# Patient Record
Sex: MALE | Race: WHITE | NOT HISPANIC OR LATINO | URBAN - METROPOLITAN AREA
[De-identification: names, ages, dates, MRNs, and addresses within clinical notes are randomized per-mention and may not be internally consistent; named-entity substitution may affect disease eponyms.]

---

## 2017-04-30 ENCOUNTER — EMERGENCY (EMERGENCY)
Facility: HOSPITAL | Age: 47
LOS: 1 days | Discharge: HOME | End: 2017-04-30
Attending: EMERGENCY MEDICINE
Payer: COMMERCIAL

## 2017-04-30 VITALS
OXYGEN SATURATION: 98 % | WEIGHT: 190.04 LBS | SYSTOLIC BLOOD PRESSURE: 142 MMHG | RESPIRATION RATE: 20 BRPM | DIASTOLIC BLOOD PRESSURE: 84 MMHG | HEIGHT: 70 IN | TEMPERATURE: 98 F | HEART RATE: 90 BPM
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
## 2017-04-30 DIAGNOSIS — S61.211A LACERATION WITHOUT FOREIGN BODY OF LEFT INDEX FINGER WITHOUT DAMAGE TO NAIL, INITIAL ENCOUNTER: ICD-10-CM
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
## 2017-04-30 DIAGNOSIS — Z23 ENCOUNTER FOR IMMUNIZATION: ICD-10-CM
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
## 2017-04-30 DIAGNOSIS — S00.531A CONTUSION OF LIP, INITIAL ENCOUNTER: ICD-10-CM

## 2017-04-30 PROCEDURE — 73140 X-RAY EXAM OF FINGER(S): CPT | Mod: 26,LT

## 2017-04-30 PROCEDURE — 12001 RPR S/N/AX/GEN/TRNK 2.5CM/<: CPT

## 2017-04-30 PROCEDURE — 73140 X-RAY EXAM OF FINGER(S): CPT

## 2017-04-30 PROCEDURE — 90715 TDAP VACCINE 7 YRS/> IM: CPT

## 2017-04-30 PROCEDURE — 99283 EMERGENCY DEPT VISIT LOW MDM: CPT | Mod: 25

## 2017-04-30 PROCEDURE — 90471 IMMUNIZATION ADMIN: CPT

## 2017-04-30 RX ORDER — TETANUS TOXOID, REDUCED DIPHTHERIA TOXOID AND ACELLULAR PERTUSSIS VACCINE, ADSORBED 5; 2.5; 8; 8; 2.5 [IU]/.5ML; [IU]/.5ML; UG/.5ML; UG/.5ML; UG/.5ML
0.5 SUSPENSION INTRAMUSCULAR ONCE
Qty: 0 | Refills: 0 | Status: COMPLETED | OUTPATIENT
Start: 2017-04-30 | End: 2017-04-30

## 2017-04-30 RX ORDER — CEPHALEXIN 500 MG
500 CAPSULE ORAL EVERY 12 HOURS
Qty: 0 | Refills: 0 | Status: DISCONTINUED | OUTPATIENT
Start: 2017-04-30 | End: 2017-05-04

## 2017-04-30 RX ORDER — LIDOCAINE HCL 20 MG/ML
10 VIAL (ML) INJECTION ONCE
Qty: 0 | Refills: 0 | Status: COMPLETED | OUTPATIENT
Start: 2017-04-30 | End: 2017-04-30

## 2017-04-30 RX ORDER — CEPHALEXIN 500 MG
1 CAPSULE ORAL
Qty: 15 | Refills: 0 | OUTPATIENT
Start: 2017-04-30 | End: 2017-05-05

## 2017-04-30 RX ADMIN — Medication 500 MILLIGRAM(S): at 23:05

## 2017-04-30 RX ADMIN — Medication 10 MILLILITER(S): at 23:06

## 2017-04-30 RX ADMIN — TETANUS TOXOID, REDUCED DIPHTHERIA TOXOID AND ACELLULAR PERTUSSIS VACCINE, ADSORBED 0.5 MILLILITER(S): 5; 2.5; 8; 8; 2.5 SUSPENSION INTRAMUSCULAR at 23:01

## 2017-04-30 NOTE — ED PROVIDER NOTE - PROGRESS NOTE DETAILS
HPI, ROS, PE done by intake doc. Orders/Plan reviewed. Pt presents today with left index finger lac. Pt is not up to date on tetanus. Pt states that he was sitting on a lawn chair and fell out of the lawn chair hitting his mouth on the floor and clipping his finger in the back of the chair, while trying to push the back of the chair down into a lying position. He denies loc, blood thinner use, fever, chills, nausea, vomiting, sob, cp, hp, dizziness, confusion, abd pain, neck pain, contusions to head, loss of ability to ambulate, gait imbalance, loss of rom of digits, weakness of digits, paresthesias of digits. PE- Well developed, well-nourish, resting comfortably in NAD. Eye: PERRL b/l, EOM intact b/l Face: no tenderness to face; including to maxilla/mandible, no contusions to head or face Mouth: no loose teeth lips: swelling and bruise to left upper lip Neuro: intact without focal deficits, A&Ox3, no gross sensory or motor deficits. Skin: 1cm laceration left 2nd digit; along nail fold. No fbs, no bone or tendon exposure. MS: FROM/strength/sensation to all digits. Plan: Anesthetized. Repaired. Counseled on wound care and signs of infection. tetanus. Keflex. hand f/u within 1 week. Motrin or Tylenol at home. Given head injury precautions. No physical activity until  cleared. Sutures out in 10 days.

## 2017-04-30 NOTE — ED PROVIDER NOTE - NS ED MD SCRIBE ATTENDING SCRIBE SECTIONS
HIV/VITAL SIGNS( Pullset)/DISPOSITION/HISTORY OF PRESENT ILLNESS/PHYSICAL EXAM/REVIEW OF SYSTEMS/PAST MEDICAL/SURGICAL/SOCIAL HISTORY

## 2017-04-30 NOTE — ED PROVIDER NOTE - ATTENDING CONTRIBUTION TO CARE
I, Axel Oswald, performed the initial face to face bedside interview with this patient regarding history of present illness, review of symptoms and relevant past medical, social and family history.  I completed an independent physical examination.  I was the initial provider who evaluated this patient.  The history, relevant review of systems, past medical and surgical history, medical decision making, and physical examination was documented by the scribe in my presence and I attest to the accuracy of the documentation.  I have signed out the follow up of any pending tests (i.e. labs, radiological studies) to the ACP.  I have communicated the patient’s plan of care and disposition with the ACP.

## 2017-04-30 NOTE — ED PROVIDER NOTE - MEDICAL DECISION MAKING DETAILS
Laceration of finger: Plan: Anesthetized. Repaired. Counseled on wound care and signs of infection. tetanus. Keflex. hand f/u within 1 week. Motrin or Tylenol at home. Given head injury precautions. No physical activity until  cleared. Sutures out in 10 days.

## 2017-04-30 NOTE — ED PROVIDER NOTE - OBJECTIVE STATEMENT
46 y/o M pt presents to ED c/o laceration to left 2nd digit. Pt states his finger got crushed in a lounge chair. He states also having a small headache while waiting in ED. No LOC. No other injuries. denies fever. denies  neck pain. no chest pain or sob. no abd pain. no n/v/d. no urinary f/u/d. no back pain. no motor or sensory deficits. denies drug use. no recent travel. no rash. no other acute issues symptoms or concerns

## 2017-05-11 ENCOUNTER — EMERGENCY (EMERGENCY)
Facility: HOSPITAL | Age: 47
LOS: 1 days | Discharge: HOME | End: 2017-05-11
Attending: EMERGENCY MEDICINE
Payer: COMMERCIAL

## 2017-05-11 VITALS
HEIGHT: 70 IN | HEART RATE: 62 BPM | RESPIRATION RATE: 20 BRPM | WEIGHT: 190.04 LBS | SYSTOLIC BLOOD PRESSURE: 123 MMHG | OXYGEN SATURATION: 100 % | TEMPERATURE: 98 F | DIASTOLIC BLOOD PRESSURE: 80 MMHG

## 2017-05-11 DIAGNOSIS — S61.211D LACERATION WITHOUT FOREIGN BODY OF LEFT INDEX FINGER WITHOUT DAMAGE TO NAIL, SUBSEQUENT ENCOUNTER: ICD-10-CM
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
## 2017-05-11 PROCEDURE — G0463: CPT

## 2017-05-11 NOTE — ED STATDOCS - SKIN, MLM
Sutured laceration to left index finger, clean, dry, and intact. No discharge or signs of infection.

## 2017-05-11 NOTE — ED STATDOCS - DETAILS:
I, Nia Cooney, personally performed the services described in the documentation, reviewed the documentation recorded by the scribe in my presence and it accurately and completely records my words and action.

## 2017-05-11 NOTE — ED STATDOCS - NS ED MD SCRIBE ATTENDING SCRIBE SECTIONS
DISPOSITION/PAST MEDICAL/SURGICAL/SOCIAL HISTORY/INTAKE ASSESSMENT/SCREENINGS/HIV/HISTORY OF PRESENT ILLNESS/PHYSICAL EXAM/REVIEW OF SYSTEMS/VITAL SIGNS( Pullset)

## 2017-05-11 NOTE — ED STATDOCS - OBJECTIVE STATEMENT
48 y/o male presents to ED for suture removal. Pt was evaluated at [facility] ~10 days ago s/p crush injury laceration to left 2nd finger, with 5 sutures placed. Pt reports that he has been irrigating the area with peroxide since placement and reports no complications. Denies fever. No further complaints at this time.

## 2018-03-26 NOTE — ED ADULT TRIAGE NOTE - CCCP TRG CHIEF CMPLNT
laceration/finger pain/injury Detail Level: Detailed Quality 130: Documentation Of Current Medications In The Medical Record: Current Medications Documented

## 2024-05-19 NOTE — ED PROCEDURE NOTE - CPROC ED COMPLICATIONS1
05/05/2024   SpO2 97%   BMI 38.74 kg/m²   Oxygen Saturation Interpretation: Normal      ---------------------------------------------------PHYSICAL EXAM--------------------------------------    Constitutional/General: Alert and oriented x3, well appearing, non toxic in NAD  Head: NC/AT  Eyes: PERRL, EOMI  Mouth: Oropharynx clear, handling secretions, no trismus  Neck: Supple, full ROM, no meningeal signs  Pulmonary: Lungs clear to auscultation bilaterally, no wheezes, rales, or rhonchi. Not in respiratory distress  Cardiovascular:  Regular rate and rhythm, no murmurs, gallops, or rubs. 2+ distal pulses  Abdomen: Soft, non tender, non distended,   Extremities: Moves all extremities x 4. Warm and well perfused  Skin: There are a few maculopapular lesions on the upper and lower extremities.  No vesicles visualized.  No eschars noted.    Neurologic: GCS 15,  Psych: Normal Affect      ------------------------------ ED COURSE/MEDICAL DECISION MAKING----------------------  Medications   dexAMETHasone (PF) (DECADRON) injection 8 mg (has no administration in time range)         Medical Decision Making:    Simple    Counseling:   The emergency provider has spoken with the patient and discussed today’s results, in addition to providing specific details for the plan of care and counseling regarding the diagnosis and prognosis.  Questions are answered at this time and they are agreeable with the plan.      --------------------------------- IMPRESSION AND DISPOSITION ---------------------------------    IMPRESSION  1. Poison ivy dermatitis        DISPOSITION  Disposition: Discharge to home  Patient condition is stable                 Marcel Lynch MD  05/19/24 1570     no